# Patient Record
Sex: FEMALE | Race: WHITE | Employment: OTHER | URBAN - METROPOLITAN AREA
[De-identification: names, ages, dates, MRNs, and addresses within clinical notes are randomized per-mention and may not be internally consistent; named-entity substitution may affect disease eponyms.]

---

## 2018-09-14 ENCOUNTER — HOSPITAL ENCOUNTER (OUTPATIENT)
Facility: CLINIC | Age: 63
Setting detail: OBSERVATION
Discharge: HOME | End: 2018-09-15
Attending: EMERGENCY MEDICINE | Admitting: INTERNAL MEDICINE
Payer: COMMERCIAL

## 2018-09-14 ENCOUNTER — APPOINTMENT (OUTPATIENT)
Dept: CT IMAGING | Facility: CLINIC | Age: 63
End: 2018-09-14
Attending: EMERGENCY MEDICINE
Payer: COMMERCIAL

## 2018-09-14 DIAGNOSIS — I16.0 HYPERTENSIVE URGENCY: ICD-10-CM

## 2018-09-14 DIAGNOSIS — R10.13 ABDOMINAL PAIN, EPIGASTRIC: ICD-10-CM

## 2018-09-14 PROBLEM — R10.9 ACUTE ABDOMINAL PAIN: Status: ACTIVE | Noted: 2018-09-14

## 2018-09-14 LAB
ALBUMIN SERPL-MCNC: 4.2 G/DL (ref 3.4–5)
ALBUMIN UR-MCNC: 30 MG/DL
ALP SERPL-CCNC: 94 U/L (ref 40–150)
ALT SERPL W P-5'-P-CCNC: 50 U/L (ref 0–50)
AMPHETAMINES UR QL SCN: NEGATIVE
ANION GAP SERPL CALCULATED.3IONS-SCNC: 8 MMOL/L (ref 3–14)
ANION GAP SERPL CALCULATED.3IONS-SCNC: 9 MMOL/L (ref 3–14)
APPEARANCE UR: CLEAR
AST SERPL W P-5'-P-CCNC: 48 U/L (ref 0–45)
BARBITURATES UR QL: NEGATIVE
BASOPHILS # BLD AUTO: 0 10E9/L (ref 0–0.2)
BASOPHILS NFR BLD AUTO: 0.4 %
BENZODIAZ UR QL: NEGATIVE
BILIRUB SERPL-MCNC: 0.9 MG/DL (ref 0.2–1.3)
BILIRUB UR QL STRIP: NEGATIVE
BUN SERPL-MCNC: 7 MG/DL (ref 7–30)
BUN SERPL-MCNC: 9 MG/DL (ref 7–30)
CALCIUM SERPL-MCNC: 8.7 MG/DL (ref 8.5–10.1)
CALCIUM SERPL-MCNC: 9 MG/DL (ref 8.5–10.1)
CANNABINOIDS UR QL SCN: POSITIVE
CHLORIDE SERPL-SCNC: 103 MMOL/L (ref 94–109)
CHLORIDE SERPL-SCNC: 106 MMOL/L (ref 94–109)
CO2 SERPL-SCNC: 24 MMOL/L (ref 20–32)
CO2 SERPL-SCNC: 26 MMOL/L (ref 20–32)
COCAINE UR QL: POSITIVE
COLOR UR AUTO: ABNORMAL
CREAT SERPL-MCNC: 0.71 MG/DL (ref 0.52–1.04)
CREAT SERPL-MCNC: 0.79 MG/DL (ref 0.52–1.04)
DIFFERENTIAL METHOD BLD: ABNORMAL
EOSINOPHIL # BLD AUTO: 0 10E9/L (ref 0–0.7)
EOSINOPHIL NFR BLD AUTO: 0.5 %
ERYTHROCYTE [DISTWIDTH] IN BLOOD BY AUTOMATED COUNT: 12.8 % (ref 10–15)
GFR SERPL CREATININE-BSD FRML MDRD: 73 ML/MIN/1.7M2
GFR SERPL CREATININE-BSD FRML MDRD: 83 ML/MIN/1.7M2
GLUCOSE SERPL-MCNC: 103 MG/DL (ref 70–99)
GLUCOSE SERPL-MCNC: 138 MG/DL (ref 70–99)
GLUCOSE UR STRIP-MCNC: NEGATIVE MG/DL
HBA1C MFR BLD: 5.2 % (ref 0–5.6)
HCT VFR BLD AUTO: 45.9 % (ref 35–47)
HGB BLD-MCNC: 15.5 G/DL (ref 11.7–15.7)
HGB UR QL STRIP: ABNORMAL
IMM GRANULOCYTES # BLD: 0 10E9/L (ref 0–0.4)
IMM GRANULOCYTES NFR BLD: 0.2 %
INTERPRETATION ECG - MUSE: NORMAL
KETONES UR STRIP-MCNC: 10 MG/DL
LACTATE BLD-SCNC: 1.2 MMOL/L (ref 0.7–2)
LACTATE SERPL-SCNC: 2.3 MMOL/L (ref 0.4–2)
LEUKOCYTE ESTERASE UR QL STRIP: NEGATIVE
LIPASE SERPL-CCNC: 53 U/L (ref 73–393)
LYMPHOCYTES # BLD AUTO: 1.9 10E9/L (ref 0.8–5.3)
LYMPHOCYTES NFR BLD AUTO: 35.4 %
MAGNESIUM SERPL-MCNC: 1.8 MG/DL (ref 1.6–2.3)
MCH RBC QN AUTO: 28.9 PG (ref 26.5–33)
MCHC RBC AUTO-ENTMCNC: 33.8 G/DL (ref 31.5–36.5)
MCV RBC AUTO: 86 FL (ref 78–100)
MONOCYTES # BLD AUTO: 0.3 10E9/L (ref 0–1.3)
MONOCYTES NFR BLD AUTO: 5.5 %
NEUTROPHILS # BLD AUTO: 3.2 10E9/L (ref 1.6–8.3)
NEUTROPHILS NFR BLD AUTO: 58 %
NITRATE UR QL: NEGATIVE
NRBC # BLD AUTO: 0 10*3/UL
NRBC BLD AUTO-RTO: 0 /100
OPIATES UR QL SCN: NEGATIVE
PCP UR QL SCN: NEGATIVE
PH UR STRIP: 8.5 PH (ref 5–7)
PLATELET # BLD AUTO: 217 10E9/L (ref 150–450)
POTASSIUM SERPL-SCNC: 3.6 MMOL/L (ref 3.4–5.3)
POTASSIUM SERPL-SCNC: 4 MMOL/L (ref 3.4–5.3)
PROT SERPL-MCNC: 9.2 G/DL (ref 6.8–8.8)
RBC # BLD AUTO: 5.36 10E12/L (ref 3.8–5.2)
RBC #/AREA URNS AUTO: 3 /HPF (ref 0–2)
SODIUM SERPL-SCNC: 137 MMOL/L (ref 133–144)
SODIUM SERPL-SCNC: 139 MMOL/L (ref 133–144)
SOURCE: ABNORMAL
SP GR UR STRIP: 1.01 (ref 1–1.03)
SQUAMOUS #/AREA URNS AUTO: <1 /HPF (ref 0–1)
TROPONIN I SERPL-MCNC: 0.02 UG/L (ref 0–0.04)
TROPONIN I SERPL-MCNC: 0.02 UG/L (ref 0–0.04)
TROPONIN I SERPL-MCNC: <0.015 UG/L (ref 0–0.04)
UROBILINOGEN UR STRIP-MCNC: NORMAL MG/DL (ref 0–2)
WBC # BLD AUTO: 5.3 10E9/L (ref 4–11)
WBC #/AREA URNS AUTO: 0 /HPF (ref 0–5)

## 2018-09-14 PROCEDURE — 76705 ECHO EXAM OF ABDOMEN: CPT

## 2018-09-14 PROCEDURE — 85025 COMPLETE CBC W/AUTO DIFF WBC: CPT | Performed by: EMERGENCY MEDICINE

## 2018-09-14 PROCEDURE — 71260 CT THORAX DX C+: CPT

## 2018-09-14 PROCEDURE — G0378 HOSPITAL OBSERVATION PER HR: HCPCS

## 2018-09-14 PROCEDURE — 84484 ASSAY OF TROPONIN QUANT: CPT | Performed by: NURSE PRACTITIONER

## 2018-09-14 PROCEDURE — 96361 HYDRATE IV INFUSION ADD-ON: CPT

## 2018-09-14 PROCEDURE — 96374 THER/PROPH/DIAG INJ IV PUSH: CPT | Mod: 59

## 2018-09-14 PROCEDURE — 84484 ASSAY OF TROPONIN QUANT: CPT | Performed by: EMERGENCY MEDICINE

## 2018-09-14 PROCEDURE — 80048 BASIC METABOLIC PNL TOTAL CA: CPT | Performed by: NURSE PRACTITIONER

## 2018-09-14 PROCEDURE — C9113 INJ PANTOPRAZOLE SODIUM, VIA: HCPCS | Performed by: EMERGENCY MEDICINE

## 2018-09-14 PROCEDURE — 83036 HEMOGLOBIN GLYCOSYLATED A1C: CPT | Performed by: EMERGENCY MEDICINE

## 2018-09-14 PROCEDURE — 25000128 H RX IP 250 OP 636

## 2018-09-14 PROCEDURE — 25000128 H RX IP 250 OP 636: Performed by: EMERGENCY MEDICINE

## 2018-09-14 PROCEDURE — 80053 COMPREHEN METABOLIC PANEL: CPT | Performed by: EMERGENCY MEDICINE

## 2018-09-14 PROCEDURE — 25000132 ZZH RX MED GY IP 250 OP 250 PS 637: Performed by: EMERGENCY MEDICINE

## 2018-09-14 PROCEDURE — 93005 ELECTROCARDIOGRAM TRACING: CPT

## 2018-09-14 PROCEDURE — 80307 DRUG TEST PRSMV CHEM ANLYZR: CPT | Performed by: EMERGENCY MEDICINE

## 2018-09-14 PROCEDURE — 83605 ASSAY OF LACTIC ACID: CPT | Performed by: NURSE PRACTITIONER

## 2018-09-14 PROCEDURE — 83690 ASSAY OF LIPASE: CPT | Performed by: EMERGENCY MEDICINE

## 2018-09-14 PROCEDURE — 25000125 ZZHC RX 250: Performed by: EMERGENCY MEDICINE

## 2018-09-14 PROCEDURE — 99285 EMERGENCY DEPT VISIT HI MDM: CPT | Mod: 25

## 2018-09-14 PROCEDURE — 96375 TX/PRO/DX INJ NEW DRUG ADDON: CPT | Mod: 59

## 2018-09-14 PROCEDURE — A9270 NON-COVERED ITEM OR SERVICE: HCPCS | Mod: GY | Performed by: EMERGENCY MEDICINE

## 2018-09-14 PROCEDURE — 81001 URINALYSIS AUTO W/SCOPE: CPT | Mod: XU | Performed by: EMERGENCY MEDICINE

## 2018-09-14 PROCEDURE — 96375 TX/PRO/DX INJ NEW DRUG ADDON: CPT

## 2018-09-14 PROCEDURE — 99220 ZZC INITIAL OBSERVATION CARE,LEVL III: CPT | Performed by: INTERNAL MEDICINE

## 2018-09-14 PROCEDURE — 83735 ASSAY OF MAGNESIUM: CPT | Performed by: EMERGENCY MEDICINE

## 2018-09-14 PROCEDURE — 96376 TX/PRO/DX INJ SAME DRUG ADON: CPT | Mod: 59

## 2018-09-14 PROCEDURE — 25000128 H RX IP 250 OP 636: Performed by: NURSE PRACTITIONER

## 2018-09-14 PROCEDURE — 83605 ASSAY OF LACTIC ACID: CPT | Performed by: EMERGENCY MEDICINE

## 2018-09-14 PROCEDURE — 36415 COLL VENOUS BLD VENIPUNCTURE: CPT | Performed by: NURSE PRACTITIONER

## 2018-09-14 PROCEDURE — 25000125 ZZHC RX 250: Performed by: NURSE PRACTITIONER

## 2018-09-14 RX ORDER — HYDRALAZINE HYDROCHLORIDE 20 MG/ML
10 INJECTION INTRAMUSCULAR; INTRAVENOUS ONCE
Status: COMPLETED | OUTPATIENT
Start: 2018-09-14 | End: 2018-09-14

## 2018-09-14 RX ORDER — ONDANSETRON 4 MG/1
4 TABLET, ORALLY DISINTEGRATING ORAL EVERY 6 HOURS PRN
Status: DISCONTINUED | OUTPATIENT
Start: 2018-09-14 | End: 2018-09-15 | Stop reason: HOSPADM

## 2018-09-14 RX ORDER — POLYETHYLENE GLYCOL 3350 17 G/17G
17 POWDER, FOR SOLUTION ORAL DAILY PRN
Status: DISCONTINUED | OUTPATIENT
Start: 2018-09-14 | End: 2018-09-15 | Stop reason: HOSPADM

## 2018-09-14 RX ORDER — AMOXICILLIN 250 MG
1 CAPSULE ORAL 2 TIMES DAILY PRN
Status: DISCONTINUED | OUTPATIENT
Start: 2018-09-14 | End: 2018-09-15 | Stop reason: HOSPADM

## 2018-09-14 RX ORDER — HYDRALAZINE HYDROCHLORIDE 20 MG/ML
20 INJECTION INTRAMUSCULAR; INTRAVENOUS ONCE
Status: COMPLETED | OUTPATIENT
Start: 2018-09-14 | End: 2018-09-14

## 2018-09-14 RX ORDER — FENTANYL CITRATE 50 UG/ML
100 INJECTION, SOLUTION INTRAMUSCULAR; INTRAVENOUS ONCE
Status: COMPLETED | OUTPATIENT
Start: 2018-09-14 | End: 2018-09-14

## 2018-09-14 RX ORDER — ONDANSETRON 2 MG/ML
4 INJECTION INTRAMUSCULAR; INTRAVENOUS EVERY 30 MIN PRN
Status: DISCONTINUED | OUTPATIENT
Start: 2018-09-14 | End: 2018-09-14

## 2018-09-14 RX ORDER — ACETAMINOPHEN 325 MG/1
650 TABLET ORAL EVERY 4 HOURS PRN
Status: DISCONTINUED | OUTPATIENT
Start: 2018-09-14 | End: 2018-09-15 | Stop reason: HOSPADM

## 2018-09-14 RX ORDER — AMOXICILLIN 250 MG
2 CAPSULE ORAL 2 TIMES DAILY PRN
Status: DISCONTINUED | OUTPATIENT
Start: 2018-09-14 | End: 2018-09-15 | Stop reason: HOSPADM

## 2018-09-14 RX ORDER — FENTANYL CITRATE 50 UG/ML
INJECTION, SOLUTION INTRAMUSCULAR; INTRAVENOUS
Status: COMPLETED
Start: 2018-09-14 | End: 2018-09-14

## 2018-09-14 RX ORDER — SODIUM CHLORIDE, SODIUM LACTATE, POTASSIUM CHLORIDE, CALCIUM CHLORIDE 600; 310; 30; 20 MG/100ML; MG/100ML; MG/100ML; MG/100ML
INJECTION, SOLUTION INTRAVENOUS CONTINUOUS
Status: ACTIVE | OUTPATIENT
Start: 2018-09-14 | End: 2018-09-14

## 2018-09-14 RX ORDER — HYDROMORPHONE HYDROCHLORIDE 1 MG/ML
0.5 INJECTION, SOLUTION INTRAMUSCULAR; INTRAVENOUS; SUBCUTANEOUS
Status: DISCONTINUED | OUTPATIENT
Start: 2018-09-14 | End: 2018-09-14

## 2018-09-14 RX ORDER — SODIUM CHLORIDE 9 MG/ML
1000 INJECTION, SOLUTION INTRAVENOUS CONTINUOUS
Status: DISCONTINUED | OUTPATIENT
Start: 2018-09-14 | End: 2018-09-14

## 2018-09-14 RX ORDER — IOPAMIDOL 755 MG/ML
80 INJECTION, SOLUTION INTRAVASCULAR ONCE
Status: COMPLETED | OUTPATIENT
Start: 2018-09-14 | End: 2018-09-14

## 2018-09-14 RX ORDER — DIPHENHYDRAMINE HYDROCHLORIDE 50 MG/ML
25 INJECTION INTRAMUSCULAR; INTRAVENOUS ONCE
Status: COMPLETED | OUTPATIENT
Start: 2018-09-14 | End: 2018-09-14

## 2018-09-14 RX ORDER — LORAZEPAM 2 MG/ML
.5-1 INJECTION INTRAMUSCULAR EVERY 4 HOURS PRN
Status: DISCONTINUED | OUTPATIENT
Start: 2018-09-14 | End: 2018-09-15 | Stop reason: HOSPADM

## 2018-09-14 RX ORDER — HYDROMORPHONE HYDROCHLORIDE 1 MG/ML
.3-.5 INJECTION, SOLUTION INTRAMUSCULAR; INTRAVENOUS; SUBCUTANEOUS
Status: DISCONTINUED | OUTPATIENT
Start: 2018-09-14 | End: 2018-09-15

## 2018-09-14 RX ORDER — METOCLOPRAMIDE HYDROCHLORIDE 5 MG/ML
10 INJECTION INTRAMUSCULAR; INTRAVENOUS ONCE
Status: COMPLETED | OUTPATIENT
Start: 2018-09-14 | End: 2018-09-14

## 2018-09-14 RX ORDER — NALOXONE HYDROCHLORIDE 0.4 MG/ML
.1-.4 INJECTION, SOLUTION INTRAMUSCULAR; INTRAVENOUS; SUBCUTANEOUS
Status: DISCONTINUED | OUTPATIENT
Start: 2018-09-14 | End: 2018-09-15 | Stop reason: HOSPADM

## 2018-09-14 RX ORDER — ACETAMINOPHEN 650 MG/1
650 SUPPOSITORY RECTAL EVERY 4 HOURS PRN
Status: DISCONTINUED | OUTPATIENT
Start: 2018-09-14 | End: 2018-09-15 | Stop reason: HOSPADM

## 2018-09-14 RX ORDER — ONDANSETRON 2 MG/ML
4 INJECTION INTRAMUSCULAR; INTRAVENOUS EVERY 6 HOURS PRN
Status: DISCONTINUED | OUTPATIENT
Start: 2018-09-14 | End: 2018-09-15 | Stop reason: HOSPADM

## 2018-09-14 RX ORDER — SODIUM CHLORIDE 9 MG/ML
INJECTION, SOLUTION INTRAVENOUS CONTINUOUS
Status: DISCONTINUED | OUTPATIENT
Start: 2018-09-14 | End: 2018-09-15 | Stop reason: HOSPADM

## 2018-09-14 RX ADMIN — Medication 0.5 MG: at 09:55

## 2018-09-14 RX ADMIN — FENTANYL CITRATE 100 MCG: 50 INJECTION INTRAMUSCULAR; INTRAVENOUS at 07:43

## 2018-09-14 RX ADMIN — ONDANSETRON 4 MG: 2 INJECTION INTRAMUSCULAR; INTRAVENOUS at 08:49

## 2018-09-14 RX ADMIN — Medication 0.5 MG: at 19:36

## 2018-09-14 RX ADMIN — IOPAMIDOL 80 ML: 755 INJECTION, SOLUTION INTRAVENOUS at 09:05

## 2018-09-14 RX ADMIN — Medication 0.5 MG: at 08:49

## 2018-09-14 RX ADMIN — SODIUM CHLORIDE 1000 ML: 9 INJECTION, SOLUTION INTRAVENOUS at 07:48

## 2018-09-14 RX ADMIN — Medication 0.5 MG: at 22:46

## 2018-09-14 RX ADMIN — SODIUM CHLORIDE 1000 ML: 9 INJECTION, SOLUTION INTRAVENOUS at 09:56

## 2018-09-14 RX ADMIN — FAMOTIDINE 20 MG: 10 INJECTION, SOLUTION INTRAVENOUS at 18:48

## 2018-09-14 RX ADMIN — HYDRALAZINE HYDROCHLORIDE 10 MG: 20 INJECTION INTRAMUSCULAR; INTRAVENOUS at 08:48

## 2018-09-14 RX ADMIN — FENTANYL CITRATE 100 MCG: 50 INJECTION, SOLUTION INTRAMUSCULAR; INTRAVENOUS at 07:43

## 2018-09-14 RX ADMIN — DIPHENHYDRAMINE HYDROCHLORIDE 25 MG: 50 INJECTION, SOLUTION INTRAMUSCULAR; INTRAVENOUS at 09:54

## 2018-09-14 RX ADMIN — SODIUM CHLORIDE, POTASSIUM CHLORIDE, SODIUM LACTATE AND CALCIUM CHLORIDE: 600; 310; 30; 20 INJECTION, SOLUTION INTRAVENOUS at 13:02

## 2018-09-14 RX ADMIN — LIDOCAINE HYDROCHLORIDE 30 ML: 20 SOLUTION ORAL; TOPICAL at 10:46

## 2018-09-14 RX ADMIN — SODIUM CHLORIDE, PRESERVATIVE FREE 80 ML: 5 INJECTION INTRAVENOUS at 09:05

## 2018-09-14 RX ADMIN — Medication 0.5 MG: at 15:26

## 2018-09-14 RX ADMIN — PANTOPRAZOLE SODIUM 40 MG: 40 INJECTION, POWDER, FOR SOLUTION INTRAVENOUS at 10:47

## 2018-09-14 RX ADMIN — HYDRALAZINE HYDROCHLORIDE 20 MG: 20 INJECTION INTRAMUSCULAR; INTRAVENOUS at 09:54

## 2018-09-14 RX ADMIN — METOCLOPRAMIDE 10 MG: 5 INJECTION, SOLUTION INTRAMUSCULAR; INTRAVENOUS at 09:54

## 2018-09-14 ASSESSMENT — ENCOUNTER SYMPTOMS
NAUSEA: 1
BLOOD IN STOOL: 0
DIARRHEA: 0
VOMITING: 1
SHORTNESS OF BREATH: 1
BACK PAIN: 0
ABDOMINAL PAIN: 1
FREQUENCY: 1

## 2018-09-14 NOTE — PHARMACY-ADMISSION MEDICATION HISTORY
Admission medication history interview status for the 9/14/2018  admission is complete. See EPIC admission navigator for prior to admission medications     Patient is on no prior to admission medications.

## 2018-09-14 NOTE — IP AVS SNAPSHOT
[facility]    [address] [place] [address]    Phone:  [phone]                                       After Visit Summary   9/14/2018    Gina Will    MRN: [id number]           After Visit Summary Signature Page     I have received my discharge instructions, and my questions have been answered. I have discussed any challenges I see with this plan with the nurse or doctor.    ..........................................................................................................................................  Patient/Patient Representative Signature      ..........................................................................................................................................  Patient Representative Print Name and Relationship to Patient    ..................................................               ................................................  Date                                   Time    ..........................................................................................................................................  Reviewed by Signature/Title    ...................................................              ..............................................  Date                                               Time          22EPIC Rev 08/18

## 2018-09-14 NOTE — ED PROVIDER NOTES
History     Chief Complaint:  Abdominal pain    HPI   Gina Will is a 63 year old female who presents with abdominal pain. The patient ate dinner last night. Subsequently the patient developed a sudden onset of abdominal pain, along with nausea and vomiting around 2200. The patient states that nothing has eased the pain and that the pain has gotten progressively worse since onset. The pain has created shortness of breath, chest pain, and increased urine output. The patient denies any hematemesis, diarrhea, back pain, or high blood pressure. The patient is visiting from [place] and has a history of hip replacement.    Allergies:  NKDA     Medications:    The patient is currently on no regular medications.      Past Medical History:    The patient denies any significant past medical history.    Past Surgical History:    The patient does not have any pertinent past surgical history  Family History:    No past pertinent family history.     Social History:  The patient admits to using cocaine most recently 1 week ago.  The patient denies tobacco or alcohol use.  Marital Status:       Review of Systems   Respiratory: Positive for shortness of breath.    Cardiovascular: Positive for chest pain.   Gastrointestinal: Positive for abdominal pain, nausea and vomiting. Negative for blood in stool and diarrhea.   Genitourinary: Positive for frequency.   Musculoskeletal: Negative for back pain.   All other systems reviewed and are negative.      Physical Exam   First Vitals:  BP: (!) 213/128  Pulse: 72  Heart Rate: 72  Temp: 98.7  F (37.1  C)  Resp: 24  SpO2: 100 %    Physical Exam  Constitutional: Well developed, nontox appearance, appears uncomfortable  Head: Atraumatic.   Mouth/Throat: Oropharynx is clear and moist.   Neck:  no stridor  Eyes: no scleral icterus  Cardiovascular: RRR, 2+ bilat radial, PT and DP pulses  Pulmonary/Chest: nml resp effort, Clear BS bilat  Abdominal: ND, +BS, soft, epigastric tenderness, no  rebound or guarding   : no CVA tenderness bilat  Ext: Warm, well perfused, no edema  Neurological: A&O, symmetric facies, moves ext x4  Skin: Skin is warm and dry.   Psychiatric: Behavior is normal. Thought content normal.   Nursing note and vitals reviewed.      Emergency Department Course     ECG:  Indication: abdomina pain  Time: 0749  Vent. Rate 75 bpm. CO interval 188. QRS duration 78. QT/QTc 412/460. P-R-T axis 20 3 -44. Sinus rhythm with premature atrial complexes. T wave inversion in inferior leads, consider inferior ischemia. Read time: 0752    Imaging:  Radiographic findings were communicated with the patient who voiced understanding of the findings.  CT Aortic Survey with IV contrast:   1. No evidence for thoracic or abdominal aortic aneurysm or  dissection. Vascular calcifications are seen, including coronary  artery calcifications.  2. Old granulomatous disease in the chest and abdomen as described  above.  3. 3.4 cm right ovarian cyst.  4. Indeterminate 4 mm nodule right upper lobe posteriorly. As per radiology.    Laboratory:  0750 Lactic Acid: 2.3    CBC: WBC: 5.3, HGB: 15.5, PLT: 217    CMP: Glucose 138, AST 48, Protein Total 9.2, o/w WNL (Creatinine: 0.71)    Lipase: 53    UA with micro: Urineketon 10, Bloo - Trace, pH 8.5, protein albumin 30, RBC/HPF 3, o/w negative    0750 Troponin: <0.015    Procedures:    Results for orders placed during the hospital encounter of 09/14/18   POC US ABDOMEN LIMITED    [facility] Procedure Note      Limited Bedside ED Gallbladder  Ultrasound:    PROCEDURE: PERFORMED BY: Dr. José Miguel Garcia  INDICATIONS:  RUQ/Epigastric Pain and Nausea and Vomiting  PROBE:  Low frequency convex probe  BODY LOCATION: Abdomen  FINDINGS:   An ultrasound of the gallbladder was performed using longitudinal and transverse views.  Gallstone(s):  Absent  Gallbladder sludge:  Absent  Sonographic Rader's sign:  Absent  Gallbladder wall thickening (greater than 4  mm):  Absent  Pericholecystic fluid: Absent  Common bile duct (dilated if internal diameter greater than 6 mm): <5 mm   INTERPRETATION:  Gallstones are present and The gallbladder evaluation is normal with no gallstones/sludge, no sonographic Rader s sign, no GB wall thickening, no pericholecystic fluid, and without evidence of cholelithiasis or cholecystitis.  Mildly distended  IMAGE DOCUMENTATION: Images were archived to PACs system.          Interventions:  0748 NS 1L IV Bolus   0743 Fentanyl 100 mcg IV    Emergency Department Course:  Nursing notes and vitals reviewed.     1935 I performed an exam of the patient as documented above.     IV inserted. Medicine administered as documented above.     Blood drawn. This was sent to the lab for further testing, results above.    The patient provided a urine sample here in the emergency department. This was sent for laboratory testing, findings above.     EKG obtained in the ED, see results above.     The patient was sent for an aortic survey CT while in the emergency department, findings above.     Procedure performed as noted above.    1030 I rechecked the patient and discussed the results of her workup thus far.     1104 I consulted with Dr. Preston, Hospitalist, regarding the patient's history and presentation here in the emergency department.    Findings and plan explained to the patient who consents to admission. Discussed the patient with Dr. Preston, who will admit the patient to an inpatient med bed for further monitoring, evaluation, and treatment.      Impression & Plan      CMS Diagnoses: The Lactic acid level is elevated due to pain and muscle contraction, at this time there is no sign of severe sepsis or septic shock.       Medical Decision Makin year old female presenting w/ epigastric abdominal pain     DDx includes hepatitis, pancreatitis, gastritis, food poisoning, UTI, atypical ACS, AAA, aortic dissection.  EKG interpretation as noted above.  Doubt  PE given symptomatology, abdominal pain.  Labs and imaging ordered as noted above.  Labs significant for minimal elevation of AST, lactic acid, normal white blood cell count, troponin negative, UA inconsistent with infection. Imaging sig for no acute aortic pathology, POC ultrasound of gallbladder negative for acute biliary pathology.  Medications given as noted above with transient improvement in the patient's symptoms.  Patient's pain did improve somewhat with a GI cocktail.  It is possible that she is experiencing gastritis.  She has been significantly hypertensive in the emergency department and received hydralazine for treatment.  Given her elevation in blood pressure, she was subsequently admitted for potential hypertensive urgency and further blood pressure management and monitoring.  Patient seems truthful when discussing her cocaine use.  Given she used approximately 1 week ago, this does not appear to be cocaine chest pain and subsequently not given lorazepam. Counseled on all results, disposition and diagnosis.  Pt understanding and agreeable to plan. Patient admitted in stable condition.        Diagnosis:    ICD-10-CM   1. Hypertensive urgency I16.0   2. Abdominal pain, epigastric R10.13       Disposition:  Admitted to inpatient med bed.    Discharge Medications:    Emilio SALMERON am serving as a scribe on 9/14/2018 at 7:35 AM to personally document services performed by No att. providers found based on my observations and the provider's statements to me.     Emilio Shah  9/14/2018    EMERGENCY DEPARTMENT       José Miguel Garcia MD  09/14/18 [id number]

## 2018-09-14 NOTE — H&P
[facility]    History and Physical  Hospitalist       Date of Admission:  9/14/2018    Assessment & Plan   Gina Will is a 63 year old female who presents with epigastric abdominal pain radiating into her substernal chest.  The patient has a past medical history of severe uncontrolled hypertension.    Epigastric Abdominal Pain: The patient presents with umbilical/epigastric abdominal pain radiating into her substernal chest.  The patient states the onset was yesterday evening approximately 2-3 hours after eating rice and beans.  The patient does endorse nausea/vomiting, shortness of breath, frequent urination, HA and chills associated with the pain, did have a normal bowel movement yesterday and is currently passing gas.  The patient denies vaginal symptoms including blood drainage or odor, urinary symptoms, concern for STI's, dizziness/lightheadedness, sick contacts, black or tarry stools, hematemesis, vision changes.  Patient states she has never had pain like this before, nothing has alleviated the pain and it has progressively worsened throughout the night propping her to seek emergency care today.  ED workup includes EKG with T-wave inversion in inferior leads, normal sinus rhythm; lab work notable for normal renal function, electrolytes within normal limits, mild elevation in AST, lactic acid elevated at 2.3.  CT aortic survey with contrast negates a thoracic or abdominal aortic aneurysm or dissection, coronary and vascular calcifications are noted, a 3.4 cm right ovarian cyst and a 4 mm nodule in the right upper lobe posteriorly.  A bedside gallbladder ultrasound is negative for evidence of cholelithiasis or cholecystitis.  Per care everywhere records, the patient has had a appendectomy and cholecystectomy in the past.  DDX: gastritis, duodenal ulcer; atypical ACS, ovarian cysts and gastroenteritis 2/2 food poisoning  --Pain control  --Rehydration with lactated Ringer's  --Recheck  "electrolytes, lactic acid following rehydration  --Pepcid IV every 12 -consider adding oral agent upon discharge  --IV Ativan for additional nausea/vomiting control    Severe uncontrolled hypertension  Albuminuria: The patient states she does not know her baseline BP however upon reviewing an office visit from her PCP available on care everywhere from July 2018 the patient's blood pressure was documented at 234/99.  It would appear that this is a chronic issue for the patient, particularly given her albuminuria, ongoing cocaine abuse and may be unrelated to her abdominal pain.  --I would avoid aggressive BP management as this is likely chronic, consider starting a thiazide diuretic once abdominal pain and nausea/vomiting subside  --Telemetry monitoring  --Trend troponins  1424 Addendum: Patient's antihypertensive medications have now taken full effect, her BP is down to 130s over 70s-which is likely much too low for this patient given her normal BP remains over 200 systolic.  I have not added any antihypertensives to allow for her blood pressure to recover, and are rehydrating her with /h ×2 hours to help support her blood pressure.    Cocaine Abuse: Patient admits to abusing cocaine as recently as 1 week prior.  She reports he she only uses it occasionally when she is \"partying\".  She denies any dependence issues, and is surprised when I discussed the risks related to her current condition in the setting of using cocaine.  During our discussion, the patient assures me she is \"never going to use cocaine again\".  --U tox: Positive for cannabinoids and cocaine, suggesting she is using it more frequently than 1 week ago  --Encourage sobriety    Elevated lactic acid: Very mild elevation in lactic acid at 2.3, suspect this is related to hypovolemia as the patient does endorse inability to keep oral food or liquids down.  --Recheck lactic acid post additional fluid bolus of 500 cc at 1400    Elevated AST: Very mild " elevation, suspect this is related to cocaine abuse vs acute GI distress.  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  She underwent urgent EMG testing and I do not see follow-up documented in her chart.  Patient denies any acute issues currently.  --Monitor and follow-up with PCP      DVT Prophylaxis: Pneumatic Compression Devices  Code Status: Full Code    Disposition: Expected discharge in 1-2 days once able to tolerate oral intake, pain is well managed and BP is stable.  Primary care appointment should be set up for this patient prior to her discharging.      Sheri Conrad, CNP    Primary Care Physician   Physician No Ref-Primary    Chief Complaint   Abdominal pain    History is obtained from the patient    History of Present Illness   Gina Will is a 63 year old female who presents with sharp epigastric abdominal pain radiating into her chest.  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 The patient presents with umbilical/epigastric abdominal pain radiating into her substernal chest.  The patient states the onset was yesterday evening approximately 2-3 hours after eating rice and beans.  The patient does endorse nausea/vomiting, shortness of breath, frequent urination, HA and chills associated with the pain, did have a normal bowel movement yesterday and is currently passing gas.  The patient denies vaginal symptoms including blood drainage or odor, urinary symptoms, concern for STI's, dizziness/lightheadedness, sick contacts, black or tarry stools, hematemesis, vision changes. Patient states she has never had pain like  this before, nothing has alleviated the pain and it has progressively worsened throughout the night propping her to seek emergency care today.      I evaluated the patient in the ED. She appears in distress, reports abdominal pain, HA, and chest pain but denies any vision changes. The patient's pain is mildly improved after her treatment in the Emergency Department. Her blood pressure remains elevated, but is now lower than her baseline. She will be admitted for further work up and management of her abdominal pain. She will need close follow up for her chronic, untreated     Past Medical History      Uncontrolled severe HTN  Mild to moderate central canal stenosis with foraminal encroachment at L3-4  Minimal effacement of thecal sac at L2-3 and L4-5 with foraminal encroachment  Albuminuria  Cocaine Abuse    Past Surgical History   I have reviewed this patient's surgical history and updated it with pertinent information if needed.  Past Surgical History:   Procedure Laterality Date     ORTHOPEDIC SURGERY     Appendectomy  Cholecystectomy    Prior to Admission Medications   None     Allergies   No Known Allergies    Social History   I have reviewed this patient's social history and updated it with pertinent information if needed. Gina Will  reports that she uses illicit drugs, including Cocaine. and marijuana, occasional/social drinking.    Family History   I have reviewed this patient's family history and updated it with pertinent information if needed.   No family history on file.    Review of Systems   CONSTITUTIONAL: NEGATIVE for fever, chills, change in weight  ENT/MOUTH: NEGATIVE for ear, mouth and throat problems  RESP: NEGATIVE for significant cough or SOB  CV: NEGATIVE for chest pain, palpitations or peripheral edema  : NEGATIVE for incontinence, sexually transmitted disease, urgency, vaginal discharge and bleeding  ENDOCRINE: NEGATIVE for temperature intolerance, skin/hair  changes  HEME/ALLERGY/IMMUNE: NEGATIVE for bleeding problems    Physical Exam   Temp: 98.7  F (37.1  C) Temp src: Oral BP: 198/86 Pulse: 72 Heart Rate: 73 Resp: 27 SpO2: 100 %      Vital Signs with Ranges  Temp:  [98.7  F (37.1  C)] 98.7  F (37.1  C)  Pulse:  [72] 72  Heart Rate:  [68-73] 73  Resp:  [13-27] 27  BP: (181-254)/() 198/86  SpO2:  [93 %-100 %] 100 %  0 lbs 0 oz    Constitutional: Awake, alert, cooperative, in mild distress.  Eyes: Conjunctiva and pupils examined and normal.  HEENT: Dry mucous membranes, normal dentition.  Respiratory: Clear to auscultation bilaterally, no crackles or wheezing.  Cardiovascular: Regular rate and rhythm, normal S1 and S2, and no murmur noted.  GI: Soft, non-distended, normal bowel sounds. Tender to umbilicus, and epigastric region  Skin: warm and dry  Neurologic: Cranial nerves 2-12 intact, normal strength and sensation.  Psychiatric: Alert, oriented to person, place and time, anxiety noted    Data   Data reviewed today:  I personally reviewed the EKG tracing showing TWI in inferior leads.    Recent Labs  Lab 09/14/18  0750   WBC 5.3   HGB 15.5   MCV 86         POTASSIUM 3.6   CHLORIDE 103   CO2 26   BUN 7   CR 0.71   ANIONGAP 8   VICKIE 9.0   *   ALBUMIN 4.2   PROTTOTAL 9.2*   BILITOTAL 0.9   ALKPHOS 94   ALT 50   AST 48*   LIPASE 53*   TROPI <0.015       Imaging:  Recent Results (from the past 24 hour(s))   CT Aortic Survey w Contrast    Narrative    CT AORTIC SURVEY WITH CONTRAST 9/14/2018 9:22 AM    HISTORY: Chest and abdominal pain.    TECHNIQUE: Helical axial scans from the lung apices through pubic  symphysis with 80 mL Isovue-370 IV contrast. Radiation dose for this  scan was reduced using automated exposure control, adjustment of the  mA and/or kV according to patient size, or iterative reconstruction  technique.    COMPARISON: None.    FINDINGS:  Chest: There is no evidence for thoracic aortic aneurysm or  dissection. Vascular calcifications  are seen, including coronary  artery calcifications. Calcified lymph nodes in the subcarinal and  right hilar regions consistent with old granulomatous disease.  Mediastinal and hilar structures are otherwise unremarkable. Small  amount of platelike atelectasis or linear scarring in the lower lung  zones bilaterally. No confluent infiltrates. There is an indeterminate  4 mm nodule in the right upper lobe posteriorly (image 15 of series  6).    Abdomen and pelvis: No evidence for abdominal aortic aneurysm or  dissection. The celiac, superior mesenteric, inferior mesenteric and  bilateral renal artery origins appear patent. Vascular calcifications  are present. The liver shows a single small calcification in the right  lobe consistent with old granulomatous disease. Old granulomatous  disease is also seen in the spleen. The pancreas, bilateral adrenal  glands and kidneys bilaterally are unremarkable with exception of a  single tiny benign cyst in the lower pole of the left kidney. The  bowel and mesentery in the upper abdomen appear normal.    Scans through the pelvis show no acute-appearing abnormality. There is  a 3.4 cm right ovarian cyst. No free fluid. Metallic artifact from  left hip arthroplasty causes some compromise of image quality in the  lower pelvis. Degenerative changes right hip. Multilevel degenerative  disc disease in the lumbar spine.      Impression    IMPRESSION:  1. No evidence for thoracic or abdominal aortic aneurysm or  dissection. Vascular calcifications are seen, including coronary  artery calcifications.  2. Old granulomatous disease in the chest and abdomen as described  above.  3. 3.4 cm right ovarian cyst.  4. Indeterminate 4 mm nodule right upper lobe posteriorly.    Recommendations for one or multiple incidental lung nodules < 6mm:    Low risk patients: No routine follow-up.    High risk patients: Optional follow-up CT at 12 months; if  unchanged, no further follow-up.    *Low Risk:  Minimal or absent history of smoking or other known risk  factors.  *Nonsolid (ground glass) or partly solid nodules may require longer  follow-up to exclude indolent adenocarcinoma.  *Recommendations based on Guidelines for the Management of Incidental  Pulmonary Nodules Detected at CT: From the Fleischner Society 2017,  Radiology 2017.   POC US ABDOMEN LIMITED    [facility] Procedure Note      Limited Bedside ED Gallbladder  Ultrasound:    PROCEDURE: PERFORMED BY: Dr. José Miguel Garcia  INDICATIONS:  RUQ/Epigastric Pain and Nausea and Vomiting  PROBE:  Low frequency convex probe  BODY LOCATION: Abdomen  FINDINGS:   An ultrasound of the gallbladder was performed using longitudinal and transverse views.  Gallstone(s):  Absent  Gallbladder sludge:  Absent  Sonographic Rader's sign:  Absent  Gallbladder wall thickening (greater than 4 mm):  Absent  Pericholecystic fluid: Absent  Common bile duct (dilated if internal diameter greater than 6 mm): <5 mm   INTERPRETATION:  Gallstones are present and The gallbladder evaluation is normal with no gallstones/sludge, no sonographic Rader s sign, no GB wall thickening, no pericholecystic fluid, and without evidence of cholelithiasis or cholecystitis.  Mildly distended  IMAGE DOCUMENTATION: Images were archived to PACs system.

## 2018-09-14 NOTE — H&P
Physician Attestation   I, Marc Preston, saw and evaluated Gina Will as part of a shared visit.  I have reviewed and discussed with the advanced practice provider their history, physical and plan.    I personally reviewed the vital signs, medications, labs and imaging.    My key history or physical exam findings:   /73 (BP Location: Right arm)  Pulse 72  Temp 99  F (37.2  C) (Oral)  Resp 18  SpO2 100%  Constitutional: NAD, afebrile, A+Ox4  Respiratory: CTA B  Cardiovascular: RRR, no murmur  GI: S, NT, ND, normal BS  Skin/Integumen: Dry, warm, no edema      Key management decisions made by me:   Admission requested from the ED for hypertensive urgency.  She is from [place], came in for pain in her lower abdomen, nausea and vomiting, noted to have Lactic Acid of 2.3 and BP of 254/132.  We collected more history from the patient, she reports BP has been high for a long time, is not treated.  Also admitted to recreational cocaine use, last time about 1 week ago.  Asked ED to collect drug screen, no beta blockers just in case.  She received Hydralazine IV total of 30mg and now BP is in 130s so we are just monitoring, can add back Hydralazine if it trends up again.  Consider starting a daily PO medication before DC.  She reports that she ate beans and rice at [employer] last night and hours after that is when the pain, nausea and vomiting started.  I discussed patient care with Sheri Conrad CNP who admitted her, I agree with her assessment and plan, the only thing I added was some IV ativan for N/V and agitation.  She could have food poisoning and her lactate may be high from volume loss related to numerous episodes of emesis, but dont want to overlook cocaine abuse, also trending troponin tonight.      Marc Preston  Date of Service (when I saw the patient): 09/14/18

## 2018-09-14 NOTE — IP AVS SNAPSHOT
MRN:[id number]                      After Visit Summary   9/14/2018    Gina Will    MRN: [id number]           Thank you!     Thank you for choosing [facility] for your care. Our goal is always to provide you with excellent care. Hearing back from our patients is one way we can continue to improve our services. Please take a few minutes to complete the written survey that you may receive in the mail after you visit with us. Thank you!        Patient Information     Date Of Birth          1955        About your hospital stay     You were admitted on:  September 14, 2018 You last received care in the:  [place] [address] [facility]    You were discharged on:  September 15, 2018        Reason for your hospital stay       Abdominal pain, high blood pressure                  Who to Call     For medical emergencies, please call 911.  For non-urgent questions about your medical care, please call your primary care provider or clinic, None          Attending Provider     Provider Specialty    José Miguel Garcia MD Emergency Medicine    Mohan, Marc Peters MD Internal Medicine       Primary Care Provider Fax #    Physician No Ref-Primary [phone]      After Care Instructions     Diet       Follow this diet upon discharge: Regular                  Follow-up Appointments     Follow-up and recommended labs and tests        Establish primary care when you return home to [facility].    Complete abstinence from cannibis, cocaine.                  Pending Results     No orders found for last 3 day(s).            Statement of Approval     Ordered          09/15/18 1103  I have reviewed and agree with all the recommendations and orders detailed in this document.  EFFECTIVE NOW     Comments:  Discharge after lunch   Approved and electronically signed by:  Em Barboza MD             Admission Information     Date & Time Provider Department Dept. Phone    9/14/2018 Mohan  "Marc Peters MD [place] [address] [facility] [phone]      Your Vitals Were     Blood Pressure Pulse Temperature Respirations Pulse Oximetry       143/82 72 99  F (37.2  C) (Oral) 18 100%       MyChart Information     [employer] lets you send messages to your doctor, view your test results, renew your prescriptions, schedule appointments and more. To sign up, go to [email or web address] . Click on \"Log in\" on the left side of the screen, which will take you to the Welcome page. Then click on \"Sign up Now\" on the right side of the page.     You will be asked to enter the access code listed below, as well as some personal information. Please follow the directions to create your username and password.     Your access code is: [email or web address]  Expires: 2018 12:22 PM     Your access code will  in 90 days. If you need help or a new code, please call your [facility] clinic or [phone].        Care EveryWhere ID     This is your Care EveryWhere ID. This could be used by other organizations to access your [facility] medical records  [id number]        Equal Access to Services     RADHA AVENDANO AH: Hadii dary Gaspar, waguevarada beckie, qaybta kaalmada julian, emily neely. So [facility] [phone].    ATENCIÓN: Si habla español, tiene a santa disposición servicios gratuitos de asistencia lingüística. Deb al [phone].    We comply with applicable federal civil rights laws and [place] laws. We do not discriminate on the basis of race, color, national origin, age, disability, sex, sexual orientation, or gender identity.               Review of your medicines      START taking        Dose / Directions    hydrochlorothiazide 12.5 MG capsule   Commonly known as:  MICROZIDE   Used for:  Hypertensive urgency        Dose:  12.5 mg   Take 1 capsule (12.5 mg) by mouth daily   Quantity:  30 capsule   Refills:  1            Where to get your medicines      These " medications were sent to [facility] [place], [place] - [address]  [address] [place] [address]     Phone:  [phone]     hydrochlorothiazide 12.5 MG capsule                Protect others around you: Learn how to safely use, store and throw away your medicines at www.disposemymeds.org.             Medication List: This is a list of all your medications and when to take them. Check marks below indicate your daily home schedule. Keep this list as a reference.      Medications           Morning Afternoon Evening Bedtime As Needed    hydrochlorothiazide 12.5 MG capsule   Commonly known as:  MICROZIDE   Take 1 capsule (12.5 mg) by mouth daily   Last time this was given:  12.5 mg on 9/15/2018 11:31 AM

## 2018-09-14 NOTE — ED NOTES
[facility]  ED Nurse Handoff Report    ED Chief complaint: Abdominal Pain (pt sts she has had lower abd radiating to epigastrum since last night after eating at [employer], also n/v)      ED Diagnosis:   Final diagnoses:   Hypertensive urgency   Abdominal pain, epigastric       Code Status: Full Code    Allergies: No Known Allergies    Activity level - Baseline/Home:  Independent    Activity Level - Current:   Independent     Needed?: No    Isolation: No  Infection: Not Applicable  Bariatric?: No    Vital Signs:   Vitals:    09/14/18 1000 09/14/18 1015 09/14/18 1030 09/14/18 1045   BP: (!) 227/119 (!) 204/88 181/83 189/88   Pulse:       Resp:       Temp:       TempSrc:       SpO2: 100%          Cardiac Rhythm: ,        Pain level: 0-10 Pain Scale: 2    Is this patient confused?: No   [place] - Suicide Severity Rating Scale Completed?  yes  If yes, what color did the patient score?  White    Patient Report: Initial Complaint: abd pain  Focused Assessment: 63 year old female who presents with abdominal pain. The patient ate dinner last night. Subsequently the patient developed a sudden onset of abdominal pain, along with nausea and vomiting around 2200. The patient states that nothing has eased the pain and that the pain has gotten progressively worse since onset. The pain has created shortness of breath, chest pain, and increased urine output. The patient denies any hematemesis, diarrhea, back pain, or high blood pressure. The patient is visiting from [place] and has a history of hip replacement. Pt very hypertensive on arrival and was given hydralazine x2  Tests Performed: labs, ct  Abnormal Results:   Results for orders placed or performed during the hospital encounter of 09/14/18   CT Aortic Survey w Contrast    Narrative    CT AORTIC SURVEY WITH CONTRAST 9/14/2018 9:22 AM    HISTORY: Chest and abdominal pain.    TECHNIQUE: Helical axial scans from the lung apices through  pubic  symphysis with 80 mL Isovue-370 IV contrast. Radiation dose for this  scan was reduced using automated exposure control, adjustment of the  mA and/or kV according to patient size, or iterative reconstruction  technique.    COMPARISON: None.    FINDINGS:  Chest: There is no evidence for thoracic aortic aneurysm or  dissection. Vascular calcifications are seen, including coronary  artery calcifications. Calcified lymph nodes in the subcarinal and  right hilar regions consistent with old granulomatous disease.  Mediastinal and hilar structures are otherwise unremarkable. Small  amount of platelike atelectasis or linear scarring in the lower lung  zones bilaterally. No confluent infiltrates. There is an indeterminate  4 mm nodule in the right upper lobe posteriorly (image 15 of series  6).    Abdomen and pelvis: No evidence for abdominal aortic aneurysm or  dissection. The celiac, superior mesenteric, inferior mesenteric and  bilateral renal artery origins appear patent. Vascular calcifications  are present. The liver shows a single small calcification in the right  lobe consistent with old granulomatous disease. Old granulomatous  disease is also seen in the spleen. The pancreas, bilateral adrenal  glands and kidneys bilaterally are unremarkable with exception of a  single tiny benign cyst in the lower pole of the left kidney. The  bowel and mesentery in the upper abdomen appear normal.    Scans through the pelvis show no acute-appearing abnormality. There is  a 3.4 cm right ovarian cyst. No free fluid. Metallic artifact from  left hip arthroplasty causes some compromise of image quality in the  lower pelvis. Degenerative changes right hip. Multilevel degenerative  disc disease in the lumbar spine.      Impression    IMPRESSION:  1. No evidence for thoracic or abdominal aortic aneurysm or  dissection. Vascular calcifications are seen, including coronary  artery calcifications.  2. Old granulomatous disease in  the chest and abdomen as described  above.  3. 3.4 cm right ovarian cyst.  4. Indeterminate 4 mm nodule right upper lobe posteriorly.    Recommendations for one or multiple incidental lung nodules < 6mm:    Low risk patients: No routine follow-up.    High risk patients: Optional follow-up CT at 12 months; if  unchanged, no further follow-up.    *Low Risk: Minimal or absent history of smoking or other known risk  factors.  *Nonsolid (ground glass) or partly solid nodules may require longer  follow-up to exclude indolent adenocarcinoma.  *Recommendations based on Guidelines for the Management of Incidental  Pulmonary Nodules Detected at CT: From the Fleischner Society 2017,  Radiology 2017.   POC US ABDOMEN LIMITED    [facility] Procedure Note      Limited Bedside ED Gallbladder  Ultrasound:    PROCEDURE: PERFORMED BY: Dr. José Miguel Garcia  INDICATIONS:  RUQ/Epigastric Pain and Nausea and Vomiting  PROBE:  Low frequency convex probe  BODY LOCATION: Abdomen  FINDINGS:   An ultrasound of the gallbladder was performed using longitudinal and transverse views.  Gallstone(s):  Absent  Gallbladder sludge:  Absent  Sonographic Rader's sign:  Absent  Gallbladder wall thickening (greater than 4 mm):  Absent  Pericholecystic fluid: Absent  Common bile duct (dilated if internal diameter greater than 6 mm): <5 mm   INTERPRETATION:  Gallstones are present and The gallbladder evaluation is normal with no gallstones/sludge, no sonographic Rader s sign, no GB wall thickening, no pericholecystic fluid, and without evidence of cholelithiasis or cholecystitis.  Mildly distended  IMAGE DOCUMENTATION: Images were archived to PACs system.     CBC with platelets differential   Result Value Ref Range    WBC 5.3 4.0 - 11.0 10e9/L    RBC Count 5.36 (H) 3.8 - 5.2 10e12/L    Hemoglobin 15.5 11.7 - 15.7 g/dL    Hematocrit 45.9 35.0 - 47.0 %    MCV 86 78 - 100 fl    MCH 28.9 26.5 - 33.0 pg    MCHC 33.8 31.5 - 36.5 g/dL     RDW 12.8 10.0 - 15.0 %    Platelet Count 217 150 - 450 10e9/L    Diff Method Automated Method     % Neutrophils 58.0 %    % Lymphocytes 35.4 %    % Monocytes 5.5 %    % Eosinophils 0.5 %    % Basophils 0.4 %    % Immature Granulocytes 0.2 %    Nucleated RBCs 0 0 /100    Absolute Neutrophil 3.2 1.6 - 8.3 10e9/L    Absolute Lymphocytes 1.9 0.8 - 5.3 10e9/L    Absolute Monocytes 0.3 0.0 - 1.3 10e9/L    Absolute Eosinophils 0.0 0.0 - 0.7 10e9/L    Absolute Basophils 0.0 0.0 - 0.2 10e9/L    Abs Immature Granulocytes 0.0 0 - 0.4 10e9/L    Absolute Nucleated RBC 0.0    Comprehensive metabolic panel   Result Value Ref Range    Sodium 137 133 - 144 mmol/L    Potassium 3.6 3.4 - 5.3 mmol/L    Chloride 103 94 - 109 mmol/L    Carbon Dioxide 26 20 - 32 mmol/L    Anion Gap 8 3 - 14 mmol/L    Glucose 138 (H) 70 - 99 mg/dL    Urea Nitrogen 7 7 - 30 mg/dL    Creatinine 0.71 0.52 - 1.04 mg/dL    GFR Estimate 83 >60 mL/min/1.7m2    GFR Estimate If Black >90 >60 mL/min/1.7m2    Calcium 9.0 8.5 - 10.1 mg/dL    Bilirubin Total 0.9 0.2 - 1.3 mg/dL    Albumin 4.2 3.4 - 5.0 g/dL    Protein Total 9.2 (H) 6.8 - 8.8 g/dL    Alkaline Phosphatase 94 40 - 150 U/L    ALT 50 0 - 50 U/L    AST 48 (H) 0 - 45 U/L   Lipase   Result Value Ref Range    Lipase 53 (L) 73 - 393 U/L   Troponin I   Result Value Ref Range    Troponin I ES <0.015 0.000 - 0.045 ug/L   UA with Microscopic   Result Value Ref Range    Color Urine Straw     Appearance Urine Clear     Glucose Urine Negative NEG^Negative mg/dL    Bilirubin Urine Negative NEG^Negative    Ketones Urine 10 (A) NEG^Negative mg/dL    Specific Gravity Urine 1.010 1.003 - 1.035    Blood Urine Trace (A) NEG^Negative    pH Urine 8.5 (H) 5.0 - 7.0 pH    Protein Albumin Urine 30 (A) NEG^Negative mg/dL    Urobilinogen mg/dL Normal 0.0 - 2.0 mg/dL    Nitrite Urine Negative NEG^Negative    Leukocyte Esterase Urine Negative NEG^Negative    Source Midstream Urine     WBC Urine 0 0 - 5 /HPF    RBC Urine 3 (H) 0 - 2  /HPF    Squamous Epithelial /HPF Urine <1 0 - 1 /HPF   Lactic acid   Result Value Ref Range    Lactic Acid 2.3 (H) 0.4 - 2.0 mmol/L   EKG 12-lead, tracing only   Result Value Ref Range    Interpretation ECG Click View Image link to view waveform and result        Treatments provided: pain and nausea meds    Family Comments: friend at bedside    OBS brochure/video discussed/provided to patient: No    ED Medications:   Medications   ondansetron (ZOFRAN) injection 4 mg (not administered)   0.9% sodium chloride BOLUS (1,000 mLs Intravenous New Bag 9/14/18 0748)     Followed by   sodium chloride 0.9% infusion (1,000 mLs Intravenous New Bag 9/14/18 0956)   HYDROmorphone (PF) (DILAUDID) injection 0.5 mg (0.5 mg Intravenous Given 9/14/18 0955)   ondansetron (ZOFRAN) injection 4 mg (4 mg Intravenous Given 9/14/18 0849)   fentaNYL (PF) (SUBLIMAZE) injection 100 mcg (100 mcg Intravenous Given 9/14/18 0743)   iopamidol (ISOVUE-370) solution 80 mL (80 mLs Intravenous Given 9/14/18 0905)   Saline flush (80 mLs Intravenous Given 9/14/18 0905)   hydrALAZINE (APRESOLINE) injection 10 mg (10 mg Intravenous Given 9/14/18 0848)   hydrALAZINE (APRESOLINE) injection 20 mg (20 mg Intravenous Given 9/14/18 0954)   metoclopramide (REGLAN) injection 10 mg (10 mg Intravenous Given 9/14/18 0954)   diphenhydrAMINE (BENADRYL) injection 25 mg (25 mg Intravenous Given 9/14/18 0954)   lidocaine (viscous) (XYLOCAINE) 2 % 15 mL, alum & mag hydroxide-simethicone (MYLANTA ES/MAALOX  ES) 15 mL GI Cocktail (30 mLs Oral Given 9/14/18 1046)   pantoprazole (PROTONIX) 40 mg IV push injection (40 mg Intravenous Given 9/14/18 1047)       Drips infusing?:  Yes    For the majority of the shift this patient was Green.   Interventions performed were     Severe Sepsis OR Septic Shock Diagnosis Present: No      ED NURSE PHONE NUMBER: [phone]

## 2018-09-14 NOTE — ED NOTES
Bed: ED21  Expected date:   Expected time:   Means of arrival:   Comments:  438  67 f abd pain  4299

## 2018-09-15 VITALS
HEART RATE: 72 BPM | DIASTOLIC BLOOD PRESSURE: 75 MMHG | RESPIRATION RATE: 18 BRPM | OXYGEN SATURATION: 100 % | SYSTOLIC BLOOD PRESSURE: 138 MMHG | TEMPERATURE: 99 F

## 2018-09-15 PROCEDURE — 99217 ZZC OBSERVATION CARE DISCHARGE: CPT | Performed by: INTERNAL MEDICINE

## 2018-09-15 PROCEDURE — 96376 TX/PRO/DX INJ SAME DRUG ADON: CPT

## 2018-09-15 PROCEDURE — 25000125 ZZHC RX 250: Performed by: NURSE PRACTITIONER

## 2018-09-15 PROCEDURE — 25000128 H RX IP 250 OP 636: Performed by: NURSE PRACTITIONER

## 2018-09-15 PROCEDURE — 99207 ZZC CDG-CODE CATEGORY CHANGED: CPT | Performed by: INTERNAL MEDICINE

## 2018-09-15 PROCEDURE — A9270 NON-COVERED ITEM OR SERVICE: HCPCS | Mod: GY | Performed by: INTERNAL MEDICINE

## 2018-09-15 PROCEDURE — 25000132 ZZH RX MED GY IP 250 OP 250 PS 637: Performed by: INTERNAL MEDICINE

## 2018-09-15 PROCEDURE — G0378 HOSPITAL OBSERVATION PER HR: HCPCS

## 2018-09-15 RX ORDER — HYDROCHLOROTHIAZIDE 12.5 MG/1
12.5 CAPSULE ORAL DAILY
Qty: 30 CAPSULE | Refills: 1 | Status: SHIPPED | OUTPATIENT
Start: 2018-09-15

## 2018-09-15 RX ORDER — OXYCODONE AND ACETAMINOPHEN 5; 325 MG/1; MG/1
1 TABLET ORAL ONCE
Status: COMPLETED | OUTPATIENT
Start: 2018-09-15 | End: 2018-09-15

## 2018-09-15 RX ORDER — HYDROCHLOROTHIAZIDE 12.5 MG/1
12.5 CAPSULE ORAL DAILY
Status: DISCONTINUED | OUTPATIENT
Start: 2018-09-15 | End: 2018-09-15 | Stop reason: HOSPADM

## 2018-09-15 RX ADMIN — FAMOTIDINE 20 MG: 10 INJECTION, SOLUTION INTRAVENOUS at 06:24

## 2018-09-15 RX ADMIN — HYDROCHLOROTHIAZIDE 12.5 MG: 12.5 CAPSULE ORAL at 11:31

## 2018-09-15 RX ADMIN — OXYCODONE HYDROCHLORIDE AND ACETAMINOPHEN 1 TABLET: 5; 325 TABLET ORAL at 11:32

## 2018-09-15 RX ADMIN — Medication 0.5 MG: at 08:31

## 2018-09-15 RX ADMIN — Medication 0.5 MG: at 04:32

## 2018-09-15 NOTE — DISCHARGE SUMMARY
[facility]    Discharge Summary  Hospitalist    Date of Admission:  9/14/2018  Date of Discharge:  9/15/2018  Discharging Provider: Em Barboza MD    Discharge Diagnoses   Abdominal pain, possible gastritis  Hypertensive urgency, likely chronic hypertension  Recent cocaine use    History of Present Illness   Gina Will is a 63 year old female who presents with sharp epigastric abdominal pain radiating into her chest.  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 The patient presents with umbilical/epigastric abdominal pain radiating into her substernal chest.  The patient states the onset was yesterday evening approximately 2-3 hours after eating rice and beans.  The patient does endorse nausea/vomiting, shortness of breath, frequent urination, HA and chills associated with the pain, did have a normal bowel movement yesterday and is currently passing gas.  The patient denies vaginal symptoms including blood drainage or odor, urinary symptoms, concern for STI's, dizziness/lightheadedness, sick contacts, black or tarry stools, hematemesis, vision changes. Patient states she has never had pain like this before, nothing has alleviated the pain and it has progressively worsened throughout the night propping her to seek emergency care today.       I evaluated the patient in the ED. She appears in distress, reports abdominal pain, HA, and chest pain but denies any vision changes. The patient's pain is mildly improved after her treatment in the Emergency Department. Her blood pressure remains elevated, but is now lower than her baseline. She will be admitted for further work up and management of her abdominal pain.    Hospital Course   Gina Will is a 63 year old female who presents with epigastric abdominal pain radiating into her substernal chest.  The patient has a past medical history  of severe uncontrolled hypertension.     Epigastric Abdominal Pain: The patient presents with umbilical/epigastric abdominal pain radiating into her substernal chest.  The patient states the onset was yesterday evening approximately 2-3 hours after eating rice and beans.  The patient does endorse nausea/vomiting, shortness of breath, frequent urination, HA and chills associated with the pain, did have a normal bowel movement yesterday and is currently passing gas.  The patient denies vaginal symptoms including blood drainage or odor, urinary symptoms, concern for STI's, dizziness/lightheadedness, sick contacts, black or tarry stools, hematemesis, vision changes.  Patient states she has never had pain like this before, nothing has alleviated the pain and it has progressively worsened throughout the night propping her to seek emergency care today.  ED workup includes EKG with T-wave inversion in inferior leads, normal sinus rhythm; lab work notable for normal renal function, electrolytes within normal limits, mild elevation in AST, lactic acid elevated at 2.3.  CT aortic survey with contrast negates a thoracic or abdominal aortic aneurysm or dissection, coronary and vascular calcifications are noted, a 3.4 cm right ovarian cyst and a 4 mm nodule in the right upper lobe posteriorly.  A bedside gallbladder ultrasound is negative for evidence of cholelithiasis or cholecystitis.  Per care everywhere records, the patient has had a appendectomy and cholecystectomy in the past.  DDX: gastritis, duodenal ulcer; atypical ACS, ovarian cysts and gastroenteritis 2/2 food poisoning  --Pain improved though did not completely resolve with H2 blocker (and with control of blood pressure.)  Supports more of a gastritis picture.  She did not want to continue Zantac however she was made aware that she can get it over the counter.  --serial troponins negative     Severe uncontrolled hypertension  Albuminuria: The patient states she does  not know her baseline BP however upon reviewing an office visit from her PCP available on care everywhere from July 2018 the patient's blood pressure was documented at 234/99.  --BP corrected to 130s after IV hydralazine and did not need further intervention.  Suspect she has underline chronic hypertension acutely exacerbated by recent cocaine use.  --Started on hydrochlorothiazide at discharge.     Cocaine Abuse: Patient admitted to recent cocaine use at a party.  Stressed the importance of cessation and offered CD services which she declined, stating she will never do it again.  --likely contributed to hypertensive urgency.     Elevated lactic acid: Very mild elevation in lactic acid at 2.3, suspect this is related to hypovolemia as the patient does endorse inability to keep oral food or liquids down.  --Recheck normal     Elevated AST: Very mild elevation, suspect this is related to cocaine abuse vs acute GI distress.  --Recheck LFTs with PCP unless clinical decline  Em Barboza MD      Unresulted Labs Ordered in the Past 30 Days of this Admission     No orders found for last 61 day(s).          Code Status   Full Code       Primary Care Physician   Physician No Ref-Primary    Physical Exam   Temp: 99  F (37.2  C) Temp src: Oral BP: 135/77   Heart Rate: 89 Resp: 18 SpO2: 100 % O2 Device: None (Room air)    There were no vitals filed for this visit.  Vital Signs with Ranges  Temp:  [98.1  F (36.7  C)-99.3  F (37.4  C)] 99  F (37.2  C)  Heart Rate:  [76-90] 89  Resp:  [14-18] 18  BP: (118-198)/(64-86) 135/77  SpO2:  [96 %-100 %] 100 %       Constitutional: Awake, alert, cooperative, no apparent distress.  Eyes: Conjunctiva and pupils examined and normal.  HEENT: Moist mucous membranes, normal dentition.  Respiratory: Clear to auscultation bilaterally, no crackles or wheezing.  Cardiovascular: Regular rate and rhythm, normal S1 and S2,   GI: Soft, non-distended, non-tender, normal bowel sounds.    Skin: No  rashes, no cyanosis, no edema.    Neurologic: Cranial nerves 2-12 intact, normal strength and sensation.    Discharge Disposition   Discharged to home  Condition at discharge: Stable    Consultations This Hospital Stay   None    Time Spent on this Encounter   IEm, personally saw the patient today and spent less than or equal to 30 minutes discharging this patient.    Discharge Orders     Reason for your hospital stay   Abdominal pain, high blood pressure     Follow-up and recommended labs and tests    Establish primary care when you return home to [place], ASAP.    Complete abstinence from cannibis, cocaine.     Full Code     Diet   Follow this diet upon discharge: Regular       Discharge Medications   Current Discharge Medication List      START taking these medications    Details   hydrochlorothiazide (MICROZIDE) 12.5 MG capsule Take 1 capsule (12.5 mg) by mouth daily  Qty: 30 capsule, Refills: 1    Associated Diagnoses: Hypertensive urgency           Allergies   No Known Allergies  Data   Most Recent 3 CBC's:  Recent Labs   Lab Test  09/14/18   0750   WBC  5.3   HGB  15.5   MCV  86   PLT  217      Most Recent 3 BMP's:  Recent Labs   Lab Test  09/14/18   1445  09/14/18   0750   NA  139  137   POTASSIUM  4.0  3.6   CHLORIDE  106  103   CO2  24  26   BUN  9  7   CR  0.79  0.71   ANIONGAP  9  8   VICKIE  8.7  9.0   GLC  103*  138*     Most Recent 2 LFT's:  Recent Labs   Lab Test  09/14/18   0750   AST  48*   ALT  50   ALKPHOS  94   BILITOTAL  0.9     Most Recent INR's and Anticoagulation Dosing History:  Anticoagulation Dose History     There is no flowsheet data to display.        Most Recent 3 Troponin's:  Recent Labs   Lab Test  09/14/18   1905  09/14/18   1445  09/14/18   0750   TROPI  0.023  0.018  <0.015     Most Recent Cholesterol Panel:No lab results found.  Most Recent 6 Bacteria Isolates From Any Culture (See EPIC Reports for Culture Details):No lab results found.  Most Recent TSH, T4 and A1c  Labs:  Recent Labs   Lab Test  09/14/18   0750   A1C  5.2     Results for orders placed or performed during the hospital encounter of 09/14/18   CT Aortic Survey w Contrast    Narrative    CT AORTIC SURVEY WITH CONTRAST 9/14/2018 9:22 AM    HISTORY: Chest and abdominal pain.    TECHNIQUE: Helical axial scans from the lung apices through pubic  symphysis with 80 mL Isovue-370 IV contrast. Radiation dose for this  scan was reduced using automated exposure control, adjustment of the  mA and/or kV according to patient size, or iterative reconstruction  technique.    COMPARISON: None.    FINDINGS:  Chest: There is no evidence for thoracic aortic aneurysm or  dissection. Vascular calcifications are seen, including coronary  artery calcifications. Calcified lymph nodes in the subcarinal and  right hilar regions consistent with old granulomatous disease.  Mediastinal and hilar structures are otherwise unremarkable. Small  amount of platelike atelectasis or linear scarring in the lower lung  zones bilaterally. No confluent infiltrates. There is an indeterminate  4 mm nodule in the right upper lobe posteriorly (image 15 of series  6).    Abdomen and pelvis: No evidence for abdominal aortic aneurysm or  dissection. The celiac, superior mesenteric, inferior mesenteric and  bilateral renal artery origins appear patent. Vascular calcifications  are present. The liver shows a single small calcification in the right  lobe consistent with old granulomatous disease. Old granulomatous  disease is also seen in the spleen. The pancreas, bilateral adrenal  glands and kidneys bilaterally are unremarkable with exception of a  single tiny benign cyst in the lower pole of the left kidney. The  bowel and mesentery in the upper abdomen appear normal.    Scans through the pelvis show no acute-appearing abnormality. There is  a 3.4 cm right ovarian cyst. No free fluid. Metallic artifact from  left hip arthroplasty causes some compromise of image  quality in the  lower pelvis. Degenerative changes right hip. Multilevel degenerative  disc disease in the lumbar spine.      Impression    IMPRESSION:  1. No evidence for thoracic or abdominal aortic aneurysm or  dissection. Vascular calcifications are seen, including coronary  artery calcifications.  2. Old granulomatous disease in the chest and abdomen as described  above.  3. 3.4 cm right ovarian cyst.  4. Indeterminate 4 mm nodule right upper lobe posteriorly.    Recommendations for one or multiple incidental lung nodules < 6mm:    Low risk patients: No routine follow-up.    High risk patients: Optional follow-up CT at 12 months; if  unchanged, no further follow-up.    *Low Risk: Minimal or absent history of smoking or other known risk  factors.  *Nonsolid (ground glass) or partly solid nodules may require longer  follow-up to exclude indolent adenocarcinoma.  *Recommendations based on Guidelines for the Management of Incidental  Pulmonary Nodules Detected at CT: From the Fleischner Society 2017,  Radiology 2017.    CONNOR LARIOS MD   POC US ABDOMEN LIMITED    Impression    [facility] Procedure Note      Limited Bedside ED Gallbladder  Ultrasound:    PROCEDURE: PERFORMED BY: Dr. José Miguel Garcia  INDICATIONS:  RUQ/Epigastric Pain and Nausea and Vomiting  PROBE:  Low frequency convex probe  BODY LOCATION: Abdomen  FINDINGS:   An ultrasound of the gallbladder was performed using longitudinal and transverse views.  Gallstone(s):  Absent  Gallbladder sludge:  Absent  Sonographic Rader's sign:  Absent  Gallbladder wall thickening (greater than 4 mm):  Absent  Pericholecystic fluid: Absent  Common bile duct (dilated if internal diameter greater than 6 mm): <5 mm   INTERPRETATION:  Gallstones are present and The gallbladder evaluation is normal with no gallstones/sludge, no sonographic Rader s sign, no GB wall thickening, no pericholecystic fluid, and without evidence of cholelithiasis or  cholecystitis.  Mildly distended  IMAGE DOCUMENTATION: Images were archived to PACs system.

## 2018-09-15 NOTE — PLAN OF CARE
Problem: Patient Care Overview  Goal: Plan of Care/Patient Progress Review  Outcome: Improving  [id number]:  Obs.  A&Ox4.  SBA.  Tolerating clears, advanced to full liquids for breakfast.  No nausea/vomiting.  Ongoing abdominal pain. IV Dilaudid x2 not very effective per patient, but she does not offer further complaints.  Is observed resting after administration.  New saline locked PIV.  VSS on RA.  Tele NSR.  Possible discharge today.

## 2018-09-15 NOTE — PLAN OF CARE
Problem: Patient Care Overview  Goal: Plan of Care/Patient Progress Review  Outcome: No Change  A&O x 4. VSS. IV is SL. IV fluids ordered, however, per Sheri Conrad CNP, only administer if patient is unable to tolerate oral intake of fluids or oral intake of fluids is not adequate. Diet advanced to Clear Liquid. IV Dilaudid x 2 for abdominal pain. Telemetry: NSR. Plan is to discharge in one to two days. Will continue to monitor.

## 2018-09-15 NOTE — PROGRESS NOTES
Discharge    Patient discharged to sister's home with the sister  Care plan note  Pt is A/O x 4. Up with SBA. VSS on RA, except hypertensive. Reports abdominal pain 7 (6) out of 10, managed with Percocet and Dilaudid. LS clear. Denies nausea, tolerating regular diet. Started on Microzide.     Listed belongings gathered and returned to patient. Yes  Care Plan and Patient education resolved: Yes  Prescriptions if needed, hard copies sent with patient  NA  Home and hospital acquired medications returned to patient: Yes  Medication Bin checked and emptied on discharge Yes  Follow up appointment made for patient: No